# Patient Record
Sex: MALE | Race: WHITE | NOT HISPANIC OR LATINO | Employment: FULL TIME | URBAN - METROPOLITAN AREA
[De-identification: names, ages, dates, MRNs, and addresses within clinical notes are randomized per-mention and may not be internally consistent; named-entity substitution may affect disease eponyms.]

---

## 2019-07-10 ENCOUNTER — OFFICE VISIT (OUTPATIENT)
Dept: SURGERY | Facility: CLINIC | Age: 34
End: 2019-07-10
Payer: COMMERCIAL

## 2019-07-10 VITALS
BODY MASS INDEX: 30.31 KG/M2 | DIASTOLIC BLOOD PRESSURE: 84 MMHG | WEIGHT: 200 LBS | SYSTOLIC BLOOD PRESSURE: 112 MMHG | HEART RATE: 76 BPM | HEIGHT: 68 IN

## 2019-07-10 DIAGNOSIS — L05.01 PILONIDAL CYST WITH ABSCESS: Primary | ICD-10-CM

## 2019-07-10 PROCEDURE — 99204 OFFICE O/P NEW MOD 45 MIN: CPT | Performed by: SPECIALIST

## 2019-07-10 RX ORDER — LISINOPRIL AND HYDROCHLOROTHIAZIDE 12.5; 1 MG/1; MG/1
1 TABLET ORAL DAILY
Refills: 3 | COMMUNITY
Start: 2019-07-01

## 2019-07-10 RX ORDER — CLOTRIMAZOLE AND BETAMETHASONE DIPROPIONATE 10; .64 MG/G; MG/G
CREAM TOPICAL
COMMUNITY
Start: 2019-04-29

## 2019-07-10 RX ORDER — CEPHALEXIN 500 MG/1
500 CAPSULE ORAL
COMMUNITY
Start: 2019-04-29 | End: 2019-07-23

## 2019-07-10 NOTE — PROGRESS NOTES
Patient is a 53-year-old white male with a long history of pilonidal cyst abscesses and sinuses  According to his wife this is been going on for years  He has been partially treated on and off since that time  The problem is not too compliant least according to his wife  He will be prescribed antibiotics and not take them Sitz baths once it etc etc     He says is draining and is intermittently tender  He says it does swell up sometimes massively and causes significant about discomfort  Physical exam:  Young adult white male who appears in good physical condition  Back:  There is a draining sinus on the left side of the midline just above the eliane cleft  Purulent drainage is noted  It is moderately tender to palpation but there is no excessive cellulitis or erythema noted  Impression :  Recurrent pilonidal cyst, sinus, abscess  Plan:  The etiology of pilonidal cyst problems are discussed with him  He is given a prescription for antibiotics for 2 weeks  She is told to take Sitz baths twice a day  Will bring him back in 2 weeks for re-evaluation and if it is improving we may schedule him after an additional 2 weeks  We usually wait around 6 weeks status post acute infection prior to undergoing definitive surgery  They understand will bring back in 2 weeks

## 2019-07-24 ENCOUNTER — OFFICE VISIT (OUTPATIENT)
Dept: SURGERY | Facility: CLINIC | Age: 34
End: 2019-07-24
Payer: COMMERCIAL

## 2019-07-24 VITALS
BODY MASS INDEX: 30.31 KG/M2 | DIASTOLIC BLOOD PRESSURE: 80 MMHG | WEIGHT: 200 LBS | HEART RATE: 70 BPM | SYSTOLIC BLOOD PRESSURE: 114 MMHG | HEIGHT: 68 IN | RESPIRATION RATE: 16 BRPM

## 2019-07-24 DIAGNOSIS — L05.91 PILONIDAL CYST: Primary | ICD-10-CM

## 2019-07-24 PROCEDURE — 99214 OFFICE O/P EST MOD 30 MIN: CPT | Performed by: SPECIALIST

## 2019-07-24 RX ORDER — AMOXICILLIN AND CLAVULANATE POTASSIUM 875; 125 MG/1; MG/1
1 TABLET, FILM COATED ORAL 2 TIMES DAILY
Refills: 1 | COMMUNITY
Start: 2019-07-11 | End: 2019-08-27

## 2019-07-24 RX ORDER — CALC/D3/MAGNES/B6/ZN/CU/MANGAN 250-400-40
TABLET ORAL DAILY
Refills: 0 | COMMUNITY
Start: 2019-07-19
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
## 2019-08-14 NOTE — PRE-PROCEDURE INSTRUCTIONS
Pre-Surgery Instructions:   Medication Instructions    lisinopril-hydrochlorothiazide (PRINZIDE,ZESTORETIC) 10-12 5 MG per tablet Instructed patient per Anesthesia Guidelines  Pre-op Showering Instructions for Surgery Patients    Before your operation, you play an important role in decreasing your risk for infection by washing with special antiseptic soap  This is an effective way to reduce bacteria on the skin which may help to prevent infections at the surgical site  Please read the following directions in advance  1  In the week before your operation, purchase a 4 ounce bottle of antiseptic soap containing chlorhexidine gluconate (CHG)  4%  Some brand names include: Aplicare®, Endure, and Hibiclens®  The cost is usually less than $5 00   For your convenience, the [employer] carries the soap   It may also be available at your doctors office or pre-admission testing center, and at most retail pharmacies   If you are allergic or sensitive to soaps containing CHG, please let your doctor know so another antiseptic can be suggested   CHG antiseptic soap is for external use only  2  The day before your operation, follow these instructions carefully to get ready   Please clean linens (sheets) on your bed; you should sleep on clean sheets after your evening shower   Get clean towels and washcloth ready - you need enough for 2 showers   Set aside clean underwear, pajamas, and clothing to wear after the showers     Reminders:   DO NOT use any other soap or body rinse on your skin during or after the antiseptic showers   DO NOT use lotion, powder, deodorant, or perfume/aftershave of any kind on your skin after your antiseptic shower   DO NOT shave any body parts in the 24 hours/day before your operation   DO NOT get the antiseptic soap in your eyes, ears, nose, mouth, or vaginal area    3   You will need to shower the night before AND the morning of your surgery  Shower 1:   The first evening before the operation, take the first shower   First, shampoo your hair with regular shampoo and rinse it completely before you use the antiseptic soap  After washing and rinsing your hair, rinse your body   Next, use a clean washcloth to apply the antiseptic soap and wash your body from the neck down to your toes using ½ bottle of the antiseptic soap   You will use the other ½ bottle for the second shower   Clean the area where your incision will be; lather this area well for about 2 minutes   If you are having head or neck surgery, wash areas with the antiseptic soap   Rinse yourself completely with running water   Use a clean towel to dry off   Wear clean underwear and clothing/pajamas  Shower 2   The morning of your operation, take the second shower following the same steps as Shower 1 using the second ½ of the bottle of antiseptic soap   Use clean cloths and towels to wash and dry yourself   Wear clean underwear and clothing

## 2019-08-16 ENCOUNTER — ANESTHESIA EVENT (OUTPATIENT)
Dept: PERIOP | Facility: HOSPITAL | Age: 34
End: 2019-08-16
Payer: COMMERCIAL

## 2019-08-16 ENCOUNTER — HOSPITAL ENCOUNTER (OUTPATIENT)
Facility: HOSPITAL | Age: 34
Setting detail: OUTPATIENT SURGERY
Discharge: HOME | End: 2019-08-16
Attending: SPECIALIST | Admitting: SPECIALIST
Payer: COMMERCIAL

## 2019-08-16 ENCOUNTER — ANESTHESIA (OUTPATIENT)
Dept: PERIOP | Facility: HOSPITAL | Age: 34
End: 2019-08-16
Payer: COMMERCIAL

## 2019-08-16 VITALS
HEART RATE: 68 BPM | DIASTOLIC BLOOD PRESSURE: 57 MMHG | SYSTOLIC BLOOD PRESSURE: 116 MMHG | RESPIRATION RATE: 18 BRPM | TEMPERATURE: 97.6 F | OXYGEN SATURATION: 100 %

## 2019-08-16 DIAGNOSIS — L05.91 PILONIDAL CYST: Primary | ICD-10-CM

## 2019-08-16 PROCEDURE — 88304 TISSUE EXAM BY PATHOLOGIST: CPT | Performed by: PATHOLOGY

## 2019-08-16 PROCEDURE — 11771 EXC PILONIDAL CYST XTNSV: CPT | Performed by: SPECIALIST

## 2019-08-16 RX ORDER — SODIUM CHLORIDE, SODIUM LACTATE, POTASSIUM CHLORIDE, CALCIUM CHLORIDE 600; 310; 30; 20 MG/100ML; MG/100ML; MG/100ML; MG/100ML
50 INJECTION, SOLUTION INTRAVENOUS CONTINUOUS
Status: DISCONTINUED | OUTPATIENT
Start: 2019-08-16 | End: 2019-08-16 | Stop reason: HOSPADM

## 2019-08-16 RX ORDER — MAGNESIUM HYDROXIDE 1200 MG/15ML
LIQUID ORAL AS NEEDED
Status: DISCONTINUED | OUTPATIENT
Start: 2019-08-16 | End: 2019-08-16 | Stop reason: HOSPADM

## 2019-08-16 RX ORDER — MIDAZOLAM HYDROCHLORIDE 1 MG/ML
INJECTION INTRAMUSCULAR; INTRAVENOUS AS NEEDED
Status: DISCONTINUED | OUTPATIENT
Start: 2019-08-16 | End: 2019-08-16 | Stop reason: SURG

## 2019-08-16 RX ORDER — FENTANYL CITRATE 50 UG/ML
INJECTION, SOLUTION INTRAMUSCULAR; INTRAVENOUS AS NEEDED
Status: DISCONTINUED | OUTPATIENT
Start: 2019-08-16 | End: 2019-08-16 | Stop reason: SURG

## 2019-08-16 RX ORDER — HYDROMORPHONE HCL/PF 1 MG/ML
0.5 SYRINGE (ML) INJECTION
Status: DISCONTINUED | OUTPATIENT
Start: 2019-08-16 | End: 2019-08-16 | Stop reason: HOSPADM

## 2019-08-16 RX ORDER — OXYCODONE HYDROCHLORIDE AND ACETAMINOPHEN 5; 325 MG/1; MG/1
2 TABLET ORAL EVERY 4 HOURS PRN
Status: DISCONTINUED | OUTPATIENT
Start: 2019-08-16 | End: 2019-08-16 | Stop reason: HOSPADM

## 2019-08-16 RX ORDER — ONDANSETRON 2 MG/ML
4 INJECTION INTRAMUSCULAR; INTRAVENOUS ONCE AS NEEDED
Status: DISCONTINUED | OUTPATIENT
Start: 2019-08-16 | End: 2019-08-16 | Stop reason: HOSPADM

## 2019-08-16 RX ORDER — BUPIVACAINE HYDROCHLORIDE 5 MG/ML
INJECTION, SOLUTION EPIDURAL; INTRACAUDAL AS NEEDED
Status: DISCONTINUED | OUTPATIENT
Start: 2019-08-16 | End: 2019-08-16 | Stop reason: HOSPADM

## 2019-08-16 RX ORDER — PROMETHAZINE HYDROCHLORIDE 25 MG/ML
12.5 INJECTION, SOLUTION INTRAMUSCULAR; INTRAVENOUS ONCE AS NEEDED
Status: DISCONTINUED | OUTPATIENT
Start: 2019-08-16 | End: 2019-08-16 | Stop reason: HOSPADM

## 2019-08-16 RX ORDER — KETOROLAC TROMETHAMINE 30 MG/ML
INJECTION, SOLUTION INTRAMUSCULAR; INTRAVENOUS AS NEEDED
Status: DISCONTINUED | OUTPATIENT
Start: 2019-08-16 | End: 2019-08-16 | Stop reason: SURG

## 2019-08-16 RX ORDER — PROPOFOL 10 MG/ML
INJECTION, EMULSION INTRAVENOUS CONTINUOUS PRN
Status: DISCONTINUED | OUTPATIENT
Start: 2019-08-16 | End: 2019-08-16 | Stop reason: SURG

## 2019-08-16 RX ORDER — SODIUM CHLORIDE, SODIUM LACTATE, POTASSIUM CHLORIDE, CALCIUM CHLORIDE 600; 310; 30; 20 MG/100ML; MG/100ML; MG/100ML; MG/100ML
INJECTION, SOLUTION INTRAVENOUS CONTINUOUS PRN
Status: DISCONTINUED | OUTPATIENT
Start: 2019-08-16 | End: 2019-08-16

## 2019-08-16 RX ORDER — CEFAZOLIN SODIUM 2 G/50ML
2000 SOLUTION INTRAVENOUS ONCE
Status: COMPLETED | OUTPATIENT
Start: 2019-08-16 | End: 2019-08-16

## 2019-08-16 RX ORDER — OXYCODONE HYDROCHLORIDE AND ACETAMINOPHEN 5; 325 MG/1; MG/1
1 TABLET ORAL EVERY 4 HOURS PRN
Status: DISCONTINUED | OUTPATIENT
Start: 2019-08-16 | End: 2019-08-16 | Stop reason: HOSPADM

## 2019-08-16 RX ORDER — ONDANSETRON 2 MG/ML
4 INJECTION INTRAMUSCULAR; INTRAVENOUS EVERY 8 HOURS PRN
Status: DISCONTINUED | OUTPATIENT
Start: 2019-08-16 | End: 2019-08-16 | Stop reason: HOSPADM

## 2019-08-16 RX ORDER — ONDANSETRON 2 MG/ML
INJECTION INTRAMUSCULAR; INTRAVENOUS AS NEEDED
Status: DISCONTINUED | OUTPATIENT
Start: 2019-08-16 | End: 2019-08-16 | Stop reason: SURG

## 2019-08-16 RX ORDER — PROPOFOL 10 MG/ML
INJECTION, EMULSION INTRAVENOUS AS NEEDED
Status: DISCONTINUED | OUTPATIENT
Start: 2019-08-16 | End: 2019-08-16 | Stop reason: SURG

## 2019-08-16 RX ORDER — LIDOCAINE HYDROCHLORIDE 10 MG/ML
INJECTION, SOLUTION INFILTRATION; PERINEURAL AS NEEDED
Status: DISCONTINUED | OUTPATIENT
Start: 2019-08-16 | End: 2019-08-16 | Stop reason: SURG

## 2019-08-16 RX ORDER — DEXAMETHASONE SODIUM PHOSPHATE 4 MG/ML
INJECTION, SOLUTION INTRA-ARTICULAR; INTRALESIONAL; INTRAMUSCULAR; INTRAVENOUS; SOFT TISSUE AS NEEDED
Status: DISCONTINUED | OUTPATIENT
Start: 2019-08-16 | End: 2019-08-16 | Stop reason: SURG

## 2019-08-16 RX ORDER — OXYCODONE HYDROCHLORIDE AND ACETAMINOPHEN 5; 325 MG/1; MG/1
1 TABLET ORAL EVERY 4 HOURS PRN
Qty: 20 TABLET | Refills: 0 | Status: SHIPPED | OUTPATIENT
Start: 2019-08-16 | End: 2019-08-26

## 2019-08-16 RX ORDER — LIDOCAINE HYDROCHLORIDE AND EPINEPHRINE 10; 10 MG/ML; UG/ML
INJECTION, SOLUTION INFILTRATION; PERINEURAL AS NEEDED
Status: DISCONTINUED | OUTPATIENT
Start: 2019-08-16 | End: 2019-08-16 | Stop reason: HOSPADM

## 2019-08-16 RX ADMIN — DEXAMETHASONE SODIUM PHOSPHATE 4 MG: 4 INJECTION, SOLUTION INTRA-ARTICULAR; INTRALESIONAL; INTRAMUSCULAR; INTRAVENOUS; SOFT TISSUE at 10:11

## 2019-08-16 RX ADMIN — PROPOFOL 30 MG: 10 INJECTION, EMULSION INTRAVENOUS at 10:27

## 2019-08-16 RX ADMIN — FENTANYL CITRATE 100 MCG: 50 INJECTION INTRAMUSCULAR; INTRAVENOUS at 09:28

## 2019-08-16 RX ADMIN — KETOROLAC TROMETHAMINE 30 MG: 30 INJECTION, SOLUTION INTRAMUSCULAR; INTRAVENOUS at 10:23

## 2019-08-16 RX ADMIN — CEFAZOLIN SODIUM 2000 MG: 2 SOLUTION INTRAVENOUS at 09:30

## 2019-08-16 RX ADMIN — LIDOCAINE HYDROCHLORIDE 50 MG: 10 INJECTION, SOLUTION INFILTRATION; PERINEURAL at 09:28

## 2019-08-16 RX ADMIN — ONDANSETRON HYDROCHLORIDE 4 MG: 2 INJECTION, SOLUTION INTRAMUSCULAR; INTRAVENOUS at 10:15

## 2019-08-16 RX ADMIN — PROPOFOL 50 MG: 10 INJECTION, EMULSION INTRAVENOUS at 09:28

## 2019-08-16 RX ADMIN — PROPOFOL 20 MG: 10 INJECTION, EMULSION INTRAVENOUS at 10:33

## 2019-08-16 RX ADMIN — PROPOFOL 100 MCG/KG/MIN: 10 INJECTION, EMULSION INTRAVENOUS at 09:28

## 2019-08-16 RX ADMIN — OXYCODONE HYDROCHLORIDE AND ACETAMINOPHEN 1 TABLET: 5; 325 TABLET ORAL at 11:35

## 2019-08-16 RX ADMIN — MIDAZOLAM HYDROCHLORIDE 2 MG: 1 INJECTION, SOLUTION INTRAMUSCULAR; INTRAVENOUS at 09:25

## 2019-08-16 RX ADMIN — SODIUM CHLORIDE, SODIUM LACTATE, POTASSIUM CHLORIDE, AND CALCIUM CHLORIDE: .6; .31; .03; .02 INJECTION, SOLUTION INTRAVENOUS at 09:15

## 2019-08-16 NOTE — INTERVAL H&P NOTE
H&P reviewed  After examining the patient I find no changes in the patients condition since the H&P had been written      Vitals:    08/16/19 0711   BP: 116/67   Pulse: 88   Resp: 20   Temp: 98 °F (36 7 °C)   SpO2: 97%

## 2019-08-16 NOTE — ANESTHESIA PREPROCEDURE EVALUATION
Review of Systems/Medical History      No history of anesthetic complications     Cardiovascular  EKG reviewed, Exercise tolerance (METS): >4,  Hypertension controlled,    Pulmonary  Negative pulmonary ROS Sleep apnea (??? snoring ) ,        GI/Hepatic  Negative GI/hepatic ROS          Negative  ROS        Endo/Other  Negative endo/other ROS      GYN  Negative gynecology ROS          Hematology  Negative hematology ROS      Musculoskeletal  Negative musculoskeletal ROS        Neurology  Negative neurology ROS      Psychology   Negative psychology ROS              Physical Exam    Airway    Mallampati score: II  TM Distance: >3 FB  Neck ROM: full     Dental       Cardiovascular  Cardiovascular exam normal    Pulmonary  Pulmonary exam normal     Other Findings        Anesthesia Plan  ASA Score- 2     Anesthesia Type- IV sedation with anesthesia with ASA Monitors  Additional Monitors:   Airway Plan:         Plan Factors-Patient not instructed to abstain from smoking on day of procedure  Patient did not smoke on day of surgery  Induction- intravenous  Postoperative Plan- Plan for postoperative opioid use  Informed Consent- Anesthetic plan and risks discussed with patient  I personally reviewed this patient with the CRNA  Discussed and agreed on the Anesthesia Plan with the CRNA  Reji Ly           No results found for: HGBA1C    No results found for: NA, K, CL, CO2, ANIONGAP, BUN, CREATININE, GLUCOSE, GLUF, CALCIUM, CORRECTEDCA, AST, ALT, ALKPHOS, PROT, BILITOT, EGFR    No results found for: WBC, HGB, HCT, MCV, PLT  Labs OK

## 2019-08-16 NOTE — NURSING NOTE
Returned from PACU at 1125  Alert and oriented  Pain 3/10 sacral area due to surgery  Medicated with Percocet 1 tab for same  Small spot of drainage on center of dressing  HOB elevated  Respirations easy and non labored  IV fluids continue  Call bell in reach

## 2019-08-16 NOTE — OP NOTE
OPERATIVE REPORT  PATIENT NAME: Shannan Narvaez    :  1985  MRN: [id number]  Pt Location:  OR ROOM 11    SURGERY DATE: 2019    Surgeon(s) and Role:     * Silver Boyd MD - Primary    Preop Diagnosis:  Pilonidal cyst [L05 91]    Post-Op Diagnosis Codes:     * Pilonidal cyst [L05 91]    Procedure(s) (LRB):  EXCISION PILONIDAL CYST (N/A)    Specimen(s):  ID Type Source Tests Collected by Time Destination   1 : Pilonidal Cyst Tissue Pilonidal Cyst/Sinus TISSUE EXAM Silver Boyd MD 2019 10:06 AM        Estimated Blood Loss:   Minimal    Drains:  * No LDAs found *    Anesthesia Type:   IV Sedation with Anesthesia    Operative Indications:  Pilonidal cyst [L05 91]      Operative Findings:      Complications:   None    Procedure and Technique:  PATIENT BROUGHT TO THE OPERATING ROOM PLACED ON THE TABLE IN A PRONE POSITION  Under adequate IV sedation the lower back and buttocks were shaved prepped and draped in usual sterile fashion  The buttocks were taped apart  Marking pencil was used to outline the incision  The areas infiltrated with 1% lidocaine with epinephrine  A 15  Scalp blade was used to make an elliptical incision encompassing all the pilonidal sinuses  This carried down subcutaneous tissue the Bovie  Bleeders cauterized at that time  The entire all pilonidal cyst was ultimately excised from the presacral fashion sent to pathology for histological diagnosis  Bleeding was controlled electrocautery  0 5% Marcaine was infiltrated  Additional bleeding was stopped  The tapes removing the incision was reapproximated with 2 0 Vicryl in subcutaneous tissue and the presacral fascia  Three 0 nylons were used in the subcutaneous tissue and dermis  Skin closed with 3 0 nylon in a vertical mattress fashion  Skin glue was placed  Dry sterile dressings applied  Estimated blood was minimal patient tolerated the procedure well he was delivered to the cover room stable condition    I was present for the entire procedure    Patient Disposition:  PACU     SIGNATURE: Rodger Campos MD  DATE: August 16, 2019  TIME: 10:50 AM

## 2019-08-16 NOTE — NURSING NOTE
No distress  Dr Esme Austin was here and gave verbal discharge instructions  Written instructions given along with prescription for Percocet  Left via ambulatory with his wife  Declined wheelchair to hemant

## 2019-08-16 NOTE — ANESTHESIA POSTPROCEDURE EVALUATION
Post-Op Assessment Note    CV Status:  Stable    Pain management: adequate     Mental Status:  Alert and awake   Hydration Status:  Stable   PONV Controlled:  None   Airway Patency:  Patent   Post Op Vitals Reviewed: Yes      Staff: CRNA           BP      Temp      Pulse     Resp      SpO2

## 2019-08-28 ENCOUNTER — OFFICE VISIT (OUTPATIENT)
Dept: SURGERY | Facility: CLINIC | Age: 34
End: 2019-08-28

## 2019-08-28 VITALS
BODY MASS INDEX: 30.31 KG/M2 | SYSTOLIC BLOOD PRESSURE: 124 MMHG | WEIGHT: 200 LBS | DIASTOLIC BLOOD PRESSURE: 90 MMHG | RESPIRATION RATE: 16 BRPM | HEART RATE: 84 BPM | HEIGHT: 68 IN

## 2019-08-28 DIAGNOSIS — L05.91 PILONIDAL CYST: Primary | ICD-10-CM

## 2019-08-28 PROCEDURE — 99024 POSTOP FOLLOW-UP VISIT: CPT | Performed by: SPECIALIST

## 2019-08-28 NOTE — PROGRESS NOTES
Leslie Jason presents today for his postop visit status post pilonidal cystectomy  His wife was with him in takes great care of him says there was some small amount of drainage  Other than that he has been doing well  Her 9year-old daughter was just diagnosed as having type 1 diabetes mellitus and they been dealing with that and also Won  Physical exam:  Young adult white male awake alert no distress    Incision:  Wound healing well for the most part  Small amount of clear serous drainage from the superior aspect  Sutures removed  Clean with peroxide  Dry sterile dressing applied  Impression:  Doing well  Wound healing  Plan:  No work till 09/09/2019  At this point will discharge him and he can call us if there is any problems but I think he is in good shape

## (undated) DEVICE — SURGICAL CLIPPER BLADE GENERAL USE

## (undated) DEVICE — SPONGE LAP STERILE 4X18

## (undated) DEVICE — SCD SEQUENTIAL COMPRESSION COMFORT SLEEVE MEDIUM KNEE LENGTH: Brand: KENDALL SCD

## (undated) DEVICE — NEEDLE 25G X 1 1/2

## (undated) DEVICE — MINOR PROCEDURE DRAPE: Brand: CONVERTORS

## (undated) DEVICE — SUT ETHILON 3-0 PS-1 18 IN 1663H

## (undated) DEVICE — BASIC PACK: Brand: CONVERTORS

## (undated) DEVICE — SUT VICRYL 3-0 SH 27 IN J416H

## (undated) DEVICE — STERILE POLYISOPRENE POWDER-FREE SURGICAL GLOVES: Brand: PROTEXIS

## (undated) DEVICE — SPONGE STICK WITH PVP-I: Brand: KENDALL

## (undated) DEVICE — SUT VICRYL 2-0 CT-1 27 IN J259H

## (undated) DEVICE — NDL CNTR 20CT FM MAG: Brand: MEDLINE INDUSTRIES, INC.

## (undated) DEVICE — GAUZE SPONGES,16 PLY: Brand: CURITY

## (undated) DEVICE — INTENDED FOR TISSUE SEPARATION, AND OTHER PROCEDURES THAT REQUIRE A SHARP SURGICAL BLADE TO PUNCTURE OR CUT.: Brand: BARD-PARKER SAFETY BLADES SIZE 15, STERILE

## (undated) DEVICE — STERILE SYNTHETIC NEOPRENE POWDER-FREE SURGICAL GLOVES WITH NITRILE COATING, SMOOTH FINISH, STRAIGHT FINGER: Brand: PROTEXIS

## (undated) DEVICE — VIOLET BRAIDED (POLYGLACTIN 910), SYNTHETIC ABSORBABLE SUTURE: Brand: COATED VICRYL

## (undated) DEVICE — SYRINGE 10ML LL CONTROL TOP

## (undated) DEVICE — NEEDLE BLUNT 18 G X 1 1/2IN

## (undated) DEVICE — PENCIL ELECTROSURG E-Z CLEAN -0035H

## (undated) DEVICE — LIGHT GLOVE GREEN

## (undated) DEVICE — ADHESIVE SKN CLSR HISTOACRYL FLEX 0.5ML LF

## (undated) DEVICE — SYRINGE 30ML LL

## (undated) DEVICE — STERILE POLYISOPRENE POWDER-FREE SURGICAL GLOVES WITH EMOLLIENT COATING: Brand: PROTEXIS

## (undated) DEVICE — SKIN MARKER DUAL TIP WITH RULER CAP, FLEXIBLE RULER AND LABELS: Brand: DEVON